# Patient Record
Sex: FEMALE | Race: WHITE | NOT HISPANIC OR LATINO | URBAN - METROPOLITAN AREA
[De-identification: names, ages, dates, MRNs, and addresses within clinical notes are randomized per-mention and may not be internally consistent; named-entity substitution may affect disease eponyms.]

---

## 2021-04-06 ENCOUNTER — TELEPHONE (OUTPATIENT)
Dept: OBSTETRICS AND GYNECOLOGY | Facility: CLINIC | Age: 71
End: 2021-04-06

## 2021-04-07 ENCOUNTER — PATIENT MESSAGE (OUTPATIENT)
Dept: OBSTETRICS AND GYNECOLOGY | Facility: CLINIC | Age: 71
End: 2021-04-07

## 2021-04-07 ENCOUNTER — TELEPHONE (OUTPATIENT)
Dept: OBSTETRICS AND GYNECOLOGY | Facility: CLINIC | Age: 71
End: 2021-04-07

## 2022-02-16 ENCOUNTER — TELEPHONE (OUTPATIENT)
Dept: OBSTETRICS AND GYNECOLOGY | Facility: CLINIC | Age: 72
End: 2022-02-16

## 2022-02-16 NOTE — TELEPHONE ENCOUNTER
----- Message from Katerin Suresh sent at 2/16/2022  4:03 PM CST -----  Contact: Patient  Patient want to schedule with Dr Rhoades.  Patient states she is a established patient      Call back #  [phone]

## 2022-03-10 ENCOUNTER — OFFICE VISIT (OUTPATIENT)
Dept: OBSTETRICS AND GYNECOLOGY | Facility: CLINIC | Age: 72
End: 2022-03-10
Payer: MEDICARE

## 2022-03-10 VITALS — HEART RATE: 63 BPM | SYSTOLIC BLOOD PRESSURE: 165 MMHG | DIASTOLIC BLOOD PRESSURE: 75 MMHG | WEIGHT: 240.81 LBS

## 2022-03-10 DIAGNOSIS — Z01.419 ENCOUNTER FOR ROUTINE GYNECOLOGIC EXAMINATION IN MEDICARE PATIENT: Primary | ICD-10-CM

## 2022-03-10 DIAGNOSIS — Z12.31 SCREENING MAMMOGRAM, ENCOUNTER FOR: ICD-10-CM

## 2022-03-10 PROCEDURE — G0101 CA SCREEN;PELVIC/BREAST EXAM: HCPCS | Mod: S$GLB,,, | Performed by: OBSTETRICS & GYNECOLOGY

## 2022-03-10 PROCEDURE — G0101 PR CA SCREEN;PELVIC/BREAST EXAM: ICD-10-PCS | Mod: S$GLB,,, | Performed by: OBSTETRICS & GYNECOLOGY

## 2022-03-10 RX ORDER — CETIRIZINE HYDROCHLORIDE 10 MG/1
10 TABLET ORAL DAILY
COMMUNITY
Start: 2021-12-24 | End: 2023-03-20

## 2022-03-10 RX ORDER — LEVOTHYROXINE SODIUM 150 MCG
TABLET ORAL
COMMUNITY
Start: 2021-12-23

## 2022-03-10 RX ORDER — PANTOPRAZOLE SODIUM 40 MG/1
40 TABLET, DELAYED RELEASE ORAL DAILY
COMMUNITY
Start: 2021-12-24 | End: 2023-03-20

## 2022-03-10 RX ORDER — VALSARTAN 160 MG/1
160 TABLET ORAL DAILY
COMMUNITY
Start: 2021-12-24

## 2022-03-10 RX ORDER — LEVOTHYROXINE SODIUM 125 UG/1
TABLET ORAL
COMMUNITY
End: 2023-03-20

## 2022-03-10 RX ORDER — AMLODIPINE BESYLATE 5 MG/1
5 TABLET ORAL DAILY
COMMUNITY
Start: 2021-12-24

## 2022-03-10 RX ORDER — ROSUVASTATIN CALCIUM 20 MG/1
20 TABLET, COATED ORAL DAILY
COMMUNITY
Start: 2022-02-07

## 2022-03-10 RX ORDER — VALSARTAN 80 MG/1
80 TABLET ORAL DAILY
COMMUNITY
Start: 2021-12-24

## 2022-03-10 RX ORDER — PANTOPRAZOLE SODIUM 40 MG/1
TABLET, DELAYED RELEASE ORAL
COMMUNITY
End: 2023-03-20

## 2022-03-10 RX ORDER — NEBIVOLOL HYDROCHLORIDE 10 MG/1
10 TABLET ORAL DAILY
COMMUNITY
Start: 2021-12-26

## 2022-03-10 NOTE — PROGRESS NOTES
Sabi Sanchez is a 72 y.o. female  who presents for a well woman exam.  No issues, problems, or complaints.      Past Medical History:   Diagnosis Date    History of endometrial cancer     HTN (hypertension)     Hypothyroidism, unspecified     Mixed hyperlipidemia      Past Surgical History:   Procedure Laterality Date    CHOLECYSTECTOMY      DILATION AND CURETTAGE OF UTERUS      x 2    LIPOMA RESECTION      back    PATELLA SURGERY Right     REVISION OF KNEE ARTHROPLASTY Right     TOTAL ABDOMINAL HYSTERECTOMY W/ BILATERAL SALPINGOOPHORECTOMY      with LND    TOTAL KNEE ARTHROPLASTY Bilateral      OB History    Para Term  AB Living   5 3           SAB IAB Ectopic Multiple Live Births                  # Outcome Date GA Lbr Myron/2nd Weight Sex Delivery Anes PTL Lv   5             4             3 Para            2 Para            1 Para               History reviewed. No pertinent family history.  Social History     Tobacco Use    Smoking status: Never Smoker    Smokeless tobacco: Never Used   Substance Use Topics    Alcohol use: Never    Drug use: Never       Current Outpatient Medications:     amLODIPine (NORVASC) 5 MG tablet, Take 5 mg by mouth once daily., Disp: , Rfl:     BYSTOLIC 10 mg Tab, Take 10 mg by mouth once daily., Disp: , Rfl:     cetirizine (ZYRTEC) 10 MG tablet, Take 10 mg by mouth once daily., Disp: , Rfl:     cetirizine 10 mg Cap, cetirizine 10 mg capsule  Take by oral route., Disp: , Rfl:     levothyroxine (SYNTHROID) 125 MCG tablet, Synthroid 125 mcg tablet  Take 1 tablet every day by oral route., Disp: , Rfl:     nebivolol HCl (BYSTOLIC ORAL), Bystolic, Disp: , Rfl:     pantoprazole (PROTONIX) 40 MG tablet, pantoprazole 40 mg tablet,delayed release  Take 1 tablet every day by oral route., Disp: , Rfl:     pantoprazole (PROTONIX) 40 MG tablet, Take 40 mg by mouth once daily., Disp: , Rfl:     rosuvastatin (CRESTOR) 20 MG  tablet, Take 20 mg by mouth once daily., Disp: , Rfl:     SYNTHROID 150 mcg tablet, TAKE 1 TABLET BY MOUTH EVERY DAY ON AN EMPTY STOMACH, Disp: , Rfl:     valsartan (DIOVAN ORAL), valsartan, Disp: , Rfl:     valsartan (DIOVAN) 160 MG tablet, Take 160 mg by mouth once daily., Disp: , Rfl:     valsartan (DIOVAN) 80 MG tablet, Take 80 mg by mouth once daily., Disp: , Rfl:    Review of patient's allergies indicates:   Allergen Reactions    Codeine     Pcn [penicillins]         ROS:  GENERAL: Denies weight gain or weight loss. Feeling well overall.   SKIN: Denies rash or lesions.   HEAD: Denies head injury or headache.   NODES: Denies enlarged lymph nodes.   CHEST: Denies shortness of breath.   CARDIOVASCULAR: Denies abdominal pain, constipation, diarrhea, nausea, vomiting or rectal bleeding.   URINARY: Denies frequency, dysuria, hematuria, or burning on urination.  REPRODUCTIVE: See HPI.   BREASTS: Denies pain, lumps, or nipple discharge.   HEMATOLOGIC: Denies easy bruisability or excessive bleeding.   MUSCULOSKELETAL: Denies joint pain or swelling.   NEUROLOGIC: Denies syncope or weakness.   PSYCHIATRIC: Denies depression, anxiety or mood swings.    PHYSICAL EXAM:    BP (!) 165/75   Pulse 63   Wt 109.2 kg (240 lb 12.8 oz)    There is no height or weight on file to calculate BMI.     APPEARANCE: Well nourished, well developed, in no acute distress.  AFFECT: WNL, alert and oriented x 3  SKIN: No acne or hirsutism  NECK: Neck symmetric without masses or thyromegaly  NODES: No inguinal, cervical, axillary, or femoral lymph node enlargement  CHEST: Good respiratory effect  ABDOMEN: Soft.  No tenderness or masses.  No hepatosplenomegaly.  No hernias.  BREASTS: Symmetrical, no skin changes or visible lesions.  No palpable masses, nipple discharge bilaterally.  PELVIC: Normal external genitalia without lesions.  Normal hair distribution.  Adequate perineal body, normal urethral meatus.  Urethra and bladder without  tenderness or masses. Vagina moist and well rugated without lesions or discharge.  No significant cystocele or rectocele.  Bimanual exam shows adnexa without masses or tenderness.    EXTREMITIES: No edema.     Encounter for routine gynecologic examination in Medicare patient  -     Liquid-based pap smear, screening    Screening mammogram, encounter for  -     Mammo Digital Screening Bilat w/ Stanislav; Future; Expected date: 04/07/2022             Patient was counseled today on A.C.S. Pap guidelines and recommendations for yearly pelvic exams, mammograms and monthly self breast exams; to see her PCP for other health maintenance.     Follow up in 1 year

## 2023-03-03 ENCOUNTER — TELEPHONE (OUTPATIENT)
Dept: OBSTETRICS AND GYNECOLOGY | Facility: CLINIC | Age: 73
End: 2023-03-03
Payer: MEDICARE

## 2023-03-03 DIAGNOSIS — E66.01 OBESITIES, MORBID: Primary | ICD-10-CM

## 2023-03-03 RX ORDER — NALTREXONE HYDROCHLORIDE AND BUPROPION HYDROCHLORIDE 8; 90 MG/1; MG/1
TABLET, EXTENDED RELEASE ORAL
Qty: 120 TABLET | Refills: 5 | Status: SHIPPED | OUTPATIENT
Start: 2023-03-03

## 2023-03-03 NOTE — TELEPHONE ENCOUNTER
Please notify pt that prescription sent to [facility] in [place] that will contact her to ship it since they can get it to her for $99 a month

## 2023-03-03 NOTE — TELEPHONE ENCOUNTER
----- Message from Sonia Hermosillo sent at 3/3/2023  9:07 AM CST -----  Regarding: pt advice  Contact: pt  Pt is calling to speak to nurse regarding getting a medication to help curb her appetite. Please call back at [phone]//thank you acc

## 2023-03-03 NOTE — TELEPHONE ENCOUNTER
"Pt. States her visit last yr. You discussed a "long term" wt. Loss med.  She would like to start it.  Pt. Does not remember the name.  Pt. Has annual appt on [id number] with you.    "

## 2023-03-20 ENCOUNTER — OFFICE VISIT (OUTPATIENT)
Dept: OBSTETRICS AND GYNECOLOGY | Facility: CLINIC | Age: 73
End: 2023-03-20
Payer: MEDICARE

## 2023-03-20 VITALS
DIASTOLIC BLOOD PRESSURE: 80 MMHG | BODY MASS INDEX: 39.9 KG/M2 | SYSTOLIC BLOOD PRESSURE: 140 MMHG | HEIGHT: 67 IN | HEART RATE: 80 BPM | WEIGHT: 254.19 LBS

## 2023-03-20 DIAGNOSIS — Z85.42 HISTORY OF ENDOMETRIAL CANCER: Primary | ICD-10-CM

## 2023-03-20 PROCEDURE — 99213 OFFICE O/P EST LOW 20 MIN: CPT | Mod: S$GLB,,, | Performed by: OBSTETRICS & GYNECOLOGY

## 2023-03-20 PROCEDURE — 99213 PR OFFICE/OUTPT VISIT, EST, LEVL III, 20-29 MIN: ICD-10-PCS | Mod: S$GLB,,, | Performed by: OBSTETRICS & GYNECOLOGY

## 2023-03-20 RX ORDER — LATANOPROST 50 UG/ML
SOLUTION/ DROPS OPHTHALMIC
COMMUNITY
Start: 2023-02-28

## 2023-03-20 RX ORDER — PREDNISONE 10 MG/1
TABLET ORAL
COMMUNITY
Start: 2022-12-28

## 2023-03-20 RX ORDER — POLYMYXIN B SULFATE AND TRIMETHOPRIM 1; 10000 MG/ML; [USP'U]/ML
SOLUTION OPHTHALMIC
COMMUNITY
Start: 2023-02-28

## 2023-03-20 RX ORDER — ASPIRIN 81 MG/1
81 TABLET ORAL DAILY
COMMUNITY

## 2023-03-20 RX ORDER — DICYCLOMINE HYDROCHLORIDE 10 MG/1
CAPSULE ORAL
COMMUNITY
Start: 2023-02-22

## 2023-03-20 RX ORDER — DEXLANSOPRAZOLE 60 MG/1
CAPSULE, DELAYED RELEASE ORAL
COMMUNITY
Start: 2023-01-11

## 2023-03-20 RX ORDER — CITALOPRAM 20 MG/1
TABLET, FILM COATED ORAL
COMMUNITY
Start: 2023-01-16

## 2023-03-20 RX ORDER — PREDNISOLONE ACETATE 10 MG/ML
SUSPENSION/ DROPS OPHTHALMIC
COMMUNITY
Start: 2023-02-28

## 2023-03-20 NOTE — PROGRESS NOTES
Chief Complaint: history of endometrial cancer     HPI:      Sabi Sanchez is a 73 y.o. female . She is without complaints. She is on Contrave for weight loss. She has moved to [place] to be near her daughter.     Past Medical History:   Diagnosis Date    Cataracts, bilateral     History of endometrial cancer     HTN (hypertension)     Hypothyroidism, unspecified     Mixed hyperlipidemia       Past Surgical History:   Procedure Laterality Date    CHOLECYSTECTOMY      DILATION AND CURETTAGE OF UTERUS      x 2    LIPOMA RESECTION      back    PATELLA SURGERY Right     REVISION OF KNEE ARTHROPLASTY Right     TOTAL ABDOMINAL HYSTERECTOMY W/ BILATERAL SALPINGOOPHORECTOMY      with LND    TOTAL KNEE ARTHROPLASTY Bilateral       Social History     Tobacco Use    Smoking status: Former     Types: Cigarettes     Passive exposure: Past    Smokeless tobacco: Never   Substance Use Topics    Alcohol use: Yes    Drug use: Never      Current Outpatient Medications on File Prior to Visit   Medication Sig Dispense Refill    amLODIPine (NORVASC) 5 MG tablet Take 5 mg by mouth once daily.      aspirin (ECOTRIN) 81 MG EC tablet Take 81 mg by mouth once daily.      BYSTOLIC 10 mg Tab Take 10 mg by mouth once daily.      cetirizine 10 mg Cap cetirizine 10 mg capsule   Take by oral route.      citalopram (CELEXA) 20 MG tablet       dexlansoprazole (DEXILANT) 60 mg capsule       dicyclomine (BENTYL) 10 MG capsule       latanoprost 0.005 % ophthalmic solution       rosuvastatin (CRESTOR) 20 MG tablet Take 20 mg by mouth once daily.      SYNTHROID 150 mcg tablet TAKE 1 TABLET BY MOUTH EVERY DAY ON AN EMPTY STOMACH      valsartan (DIOVAN) 160 MG tablet Take 160 mg by mouth once daily.      valsartan (DIOVAN) 80 MG tablet Take 80 mg by mouth once daily.      naltrexone-bupropion (CONTRAVE) 8-90 mg TbSR 1 po qAM x 7 days, then 1 po BID x 7 days, then 2 po q AM and 1 po q PM x 7 days, then 2 po BID (Patient not  "taking: Reported on 3/20/2023) 120 tablet 5    polymyxin B sulf-trimethoprim (POLYTRIM) 10,000 unit- 1 mg/mL Drop       prednisoLONE acetate (PRED FORTE) 1 % DrpS       predniSONE (DELTASONE) 10 MG tablet       [DISCONTINUED] cetirizine (ZYRTEC) 10 MG tablet Take 10 mg by mouth once daily.      [DISCONTINUED] levothyroxine (SYNTHROID) 125 MCG tablet Synthroid 125 mcg tablet   Take 1 tablet every day by oral route.      [DISCONTINUED] nebivolol HCl (BYSTOLIC ORAL) Bystolic      [DISCONTINUED] pantoprazole (PROTONIX) 40 MG tablet pantoprazole 40 mg tablet,delayed release   Take 1 tablet every day by oral route.      [DISCONTINUED] pantoprazole (PROTONIX) 40 MG tablet Take 40 mg by mouth once daily.      [DISCONTINUED] valsartan (DIOVAN ORAL) valsartan       No current facility-administered medications on file prior to visit.      Review of patient's allergies indicates:   Allergen Reactions    Codeine     E-mycin [erythromycin]     Pcn [penicillins]           ROS:     GENERAL: Denies weight gain or weight loss. Feeling well overall.   SKIN: Denies rash or lesions.   NODES: Denies enlarged lymph nodes.   CARDIOVASCULAR: Denies palpitations or chest pain.   ABDOMEN: Denies abdominal pain, constipation, diarrhea, nausea, vomiting or rectal bleeding.   URINARY: Denies frequency, dysuria, hematuria, or burning on urination.  REPRODUCTIVE: See HPI.   BREASTS: Denies pain, lumps, or nipple discharge.   HEMATOLOGIC: Denies easy bruisability or excessive bleeding   PSYCHIATRIC: Denies depression, anxiety or mood swings.   Physical Exam:      BP (!) 140/80 (BP Location: Left arm, Patient Position: Sitting, BP Method: Large (Manual))   Pulse 80   Ht 5' 7" (1.702 m)   Wt 115.3 kg (254 lb 3.2 oz)   BMI 39.81 kg/m²   Body mass index is 39.81 kg/m².   BREASTS: no adenopathy, masses, skin changes, nipple discharge  ABDOMEN: Soft.  No tenderness or masses. No hepatoslenomegaly. No hernias.   PELVIC: Normal external genitalia " without lesions.  Normal hair distribution.  Adequate perineal body, normal urethral meatus.  Urethra and bladder without tenderness or masses. Vagina without lesions or discharge.   Bimanual exam shows adnexal area without masses or tenderness.      Assessment/Plan:     History of endometrial cancer    Normal exam